# Patient Record
Sex: MALE | Race: WHITE | NOT HISPANIC OR LATINO | Employment: OTHER | URBAN - METROPOLITAN AREA
[De-identification: names, ages, dates, MRNs, and addresses within clinical notes are randomized per-mention and may not be internally consistent; named-entity substitution may affect disease eponyms.]

---

## 2023-01-01 ENCOUNTER — APPOINTMENT (OUTPATIENT)
Dept: PRIMARY CARE | Facility: CLINIC | Age: 83
End: 2023-01-01
Payer: MEDICARE

## 2023-01-01 ENCOUNTER — TELEPHONE (OUTPATIENT)
Dept: PRIMARY CARE | Facility: CLINIC | Age: 83
End: 2023-01-01
Payer: MEDICARE

## 2023-01-01 ENCOUNTER — OFFICE VISIT (OUTPATIENT)
Dept: PRIMARY CARE | Facility: CLINIC | Age: 83
End: 2023-01-01
Payer: MEDICARE

## 2023-01-01 ENCOUNTER — LAB (OUTPATIENT)
Dept: LAB | Facility: LAB | Age: 83
End: 2023-01-01
Payer: MEDICARE

## 2023-01-01 ENCOUNTER — PATIENT OUTREACH (OUTPATIENT)
Dept: PRIMARY CARE | Facility: CLINIC | Age: 83
End: 2023-01-01

## 2023-01-01 ENCOUNTER — TELEPHONE (OUTPATIENT)
Dept: PRIMARY CARE | Facility: CLINIC | Age: 83
End: 2023-01-01

## 2023-01-01 ENCOUNTER — PATIENT OUTREACH (OUTPATIENT)
Dept: PRIMARY CARE | Facility: CLINIC | Age: 83
End: 2023-01-01
Payer: MEDICARE

## 2023-01-01 VITALS
HEART RATE: 55 BPM | HEIGHT: 68 IN | SYSTOLIC BLOOD PRESSURE: 126 MMHG | TEMPERATURE: 96.8 F | OXYGEN SATURATION: 97 % | BODY MASS INDEX: 27.28 KG/M2 | WEIGHT: 180 LBS | DIASTOLIC BLOOD PRESSURE: 59 MMHG

## 2023-01-01 VITALS
OXYGEN SATURATION: 99 % | TEMPERATURE: 96.4 F | SYSTOLIC BLOOD PRESSURE: 131 MMHG | BODY MASS INDEX: 26.98 KG/M2 | HEART RATE: 56 BPM | HEIGHT: 68 IN | WEIGHT: 178 LBS | DIASTOLIC BLOOD PRESSURE: 65 MMHG

## 2023-01-01 DIAGNOSIS — G31.84 MCI (MILD COGNITIVE IMPAIRMENT): Primary | ICD-10-CM

## 2023-01-01 DIAGNOSIS — Z86.79 PERSONAL HISTORY OF OTHER DISEASES OF THE CIRCULATORY SYSTEM: ICD-10-CM

## 2023-01-01 DIAGNOSIS — I10 ESSENTIAL HYPERTENSION: Primary | ICD-10-CM

## 2023-01-01 DIAGNOSIS — G93.40 ENCEPHALOPATHY: Primary | ICD-10-CM

## 2023-01-01 DIAGNOSIS — F22 DELUSION (MULTI): ICD-10-CM

## 2023-01-01 DIAGNOSIS — F03.918 DEMENTIA WITH BEHAVIORAL DISTURBANCE (MULTI): Primary | ICD-10-CM

## 2023-01-01 DIAGNOSIS — R26.81 GAIT INSTABILITY: ICD-10-CM

## 2023-01-01 DIAGNOSIS — I10 ESSENTIAL HYPERTENSION: ICD-10-CM

## 2023-01-01 DIAGNOSIS — G31.84 MCI (MILD COGNITIVE IMPAIRMENT): ICD-10-CM

## 2023-01-01 DIAGNOSIS — N40.1 BENIGN PROSTATIC HYPERPLASIA WITH LOWER URINARY TRACT SYMPTOMS, SYMPTOM DETAILS UNSPECIFIED: ICD-10-CM

## 2023-01-01 DIAGNOSIS — F22 DELUSIONS (MULTI): ICD-10-CM

## 2023-01-01 DIAGNOSIS — R41.82 ALTERED MENTAL STATUS, UNSPECIFIED ALTERED MENTAL STATUS TYPE: ICD-10-CM

## 2023-01-01 LAB
ANION GAP IN SER/PLAS: 11 MMOL/L (ref 10–20)
ANION GAP IN SER/PLAS: 12 MMOL/L (ref 10–20)
APPEARANCE, URINE: CLEAR
BASOPHILS (10*3/UL) IN BLOOD BY AUTOMATED COUNT: 0.06 X10E9/L (ref 0–0.1)
BASOPHILS/100 LEUKOCYTES IN BLOOD BY AUTOMATED COUNT: 0.9 % (ref 0–2)
BILIRUBIN, URINE: NEGATIVE
BLOOD, URINE: NEGATIVE
CALCIUM (MG/DL) IN SER/PLAS: 7.8 MG/DL (ref 8.6–10.3)
CALCIUM (MG/DL) IN SER/PLAS: 9.4 MG/DL (ref 8.6–10.6)
CARBON DIOXIDE, TOTAL (MMOL/L) IN SER/PLAS: 25 MMOL/L (ref 21–32)
CARBON DIOXIDE, TOTAL (MMOL/L) IN SER/PLAS: 26 MMOL/L (ref 21–32)
CHLORIDE (MMOL/L) IN SER/PLAS: 105 MMOL/L (ref 98–107)
CHLORIDE (MMOL/L) IN SER/PLAS: 105 MMOL/L (ref 98–107)
COLOR, URINE: YELLOW
CREATININE (MG/DL) IN SER/PLAS: 1.18 MG/DL (ref 0.5–1.3)
CREATININE (MG/DL) IN SER/PLAS: 1.53 MG/DL (ref 0.5–1.3)
EOSINOPHILS (10*3/UL) IN BLOOD BY AUTOMATED COUNT: 0.23 X10E9/L (ref 0–0.4)
EOSINOPHILS/100 LEUKOCYTES IN BLOOD BY AUTOMATED COUNT: 3.3 % (ref 0–6)
ERYTHROCYTE DISTRIBUTION WIDTH (RATIO) BY AUTOMATED COUNT: 14.3 % (ref 11.5–14.5)
ERYTHROCYTE DISTRIBUTION WIDTH (RATIO) BY AUTOMATED COUNT: 16 % (ref 11.5–14.5)
ERYTHROCYTE MEAN CORPUSCULAR HEMOGLOBIN CONCENTRATION (G/DL) BY AUTOMATED: 30.9 G/DL (ref 32–36)
ERYTHROCYTE MEAN CORPUSCULAR HEMOGLOBIN CONCENTRATION (G/DL) BY AUTOMATED: 32.8 G/DL (ref 32–36)
ERYTHROCYTE MEAN CORPUSCULAR VOLUME (FL) BY AUTOMATED COUNT: 100 FL (ref 80–100)
ERYTHROCYTE MEAN CORPUSCULAR VOLUME (FL) BY AUTOMATED COUNT: 97 FL (ref 80–100)
ERYTHROCYTES (10*6/UL) IN BLOOD BY AUTOMATED COUNT: 3.16 X10E12/L (ref 4.5–5.9)
ERYTHROCYTES (10*6/UL) IN BLOOD BY AUTOMATED COUNT: 4.07 X10E12/L (ref 4.5–5.9)
GFR MALE: 44 ML/MIN/1.73M2
GFR MALE: 60 ML/MIN/1.73M2
GLUCOSE (MG/DL) IN SER/PLAS: 100 MG/DL (ref 74–99)
GLUCOSE (MG/DL) IN SER/PLAS: 91 MG/DL (ref 74–99)
GLUCOSE, URINE: NEGATIVE MG/DL
HEMATOCRIT (%) IN BLOOD BY AUTOMATED COUNT: 31.7 % (ref 41–52)
HEMATOCRIT (%) IN BLOOD BY AUTOMATED COUNT: 39.3 % (ref 41–52)
HEMOGLOBIN (G/DL) IN BLOOD: 12.9 G/DL (ref 13.5–17.5)
HEMOGLOBIN (G/DL) IN BLOOD: 9.8 G/DL (ref 13.5–17.5)
IMMATURE GRANULOCYTES/100 LEUKOCYTES IN BLOOD BY AUTOMATED COUNT: 0.9 % (ref 0–0.9)
KETONES, URINE: NEGATIVE MG/DL
LEUKOCYTE ESTERASE, URINE: NEGATIVE
LEUKOCYTES (10*3/UL) IN BLOOD BY AUTOMATED COUNT: 10.7 X10E9/L (ref 4.4–11.3)
LEUKOCYTES (10*3/UL) IN BLOOD BY AUTOMATED COUNT: 7 X10E9/L (ref 4.4–11.3)
LYMPHOCYTES (10*3/UL) IN BLOOD BY AUTOMATED COUNT: 1.24 X10E9/L (ref 0.8–3)
LYMPHOCYTES/100 LEUKOCYTES IN BLOOD BY AUTOMATED COUNT: 17.8 % (ref 13–44)
MONOCYTES (10*3/UL) IN BLOOD BY AUTOMATED COUNT: 0.61 X10E9/L (ref 0.05–0.8)
MONOCYTES/100 LEUKOCYTES IN BLOOD BY AUTOMATED COUNT: 8.8 % (ref 2–10)
NEUTROPHILS (10*3/UL) IN BLOOD BY AUTOMATED COUNT: 4.76 X10E9/L (ref 1.6–5.5)
NEUTROPHILS/100 LEUKOCYTES IN BLOOD BY AUTOMATED COUNT: 68.3 % (ref 40–80)
NITRITE, URINE: NEGATIVE
NRBC (PER 100 WBCS) BY AUTOMATED COUNT: 0 /100 WBC (ref 0–0)
NRBC (PER 100 WBCS) BY AUTOMATED COUNT: 0 /100 WBC (ref 0–0)
PH, URINE: 5 (ref 5–8)
PLATELETS (10*3/UL) IN BLOOD AUTOMATED COUNT: 224 X10E9/L (ref 150–450)
PLATELETS (10*3/UL) IN BLOOD AUTOMATED COUNT: 281 X10E9/L (ref 150–450)
POTASSIUM (MMOL/L) IN SER/PLAS: 4.1 MMOL/L (ref 3.5–5.3)
POTASSIUM (MMOL/L) IN SER/PLAS: 4.7 MMOL/L (ref 3.5–5.3)
PROTEIN, URINE: NEGATIVE MG/DL
SODIUM (MMOL/L) IN SER/PLAS: 136 MMOL/L (ref 136–145)
SODIUM (MMOL/L) IN SER/PLAS: 139 MMOL/L (ref 136–145)
SPECIFIC GRAVITY, URINE: 1.02 (ref 1–1.03)
THYROTROPIN (MIU/L) IN SER/PLAS BY DETECTION LIMIT <= 0.05 MIU/L: 1.94 MIU/L (ref 0.44–3.98)
UREA NITROGEN (MG/DL) IN SER/PLAS: 29 MG/DL (ref 6–23)
UREA NITROGEN (MG/DL) IN SER/PLAS: 34 MG/DL (ref 6–23)
UROBILINOGEN, URINE: <2 MG/DL (ref 0–1.9)

## 2023-01-01 PROCEDURE — 1036F TOBACCO NON-USER: CPT | Performed by: FAMILY MEDICINE

## 2023-01-01 PROCEDURE — 1160F RVW MEDS BY RX/DR IN RCRD: CPT | Performed by: FAMILY MEDICINE

## 2023-01-01 PROCEDURE — 81003 URINALYSIS AUTO W/O SCOPE: CPT

## 2023-01-01 PROCEDURE — 1159F MED LIST DOCD IN RCRD: CPT | Performed by: FAMILY MEDICINE

## 2023-01-01 PROCEDURE — 99496 TRANSJ CARE MGMT HIGH F2F 7D: CPT | Performed by: FAMILY MEDICINE

## 2023-01-01 PROCEDURE — 99214 OFFICE O/P EST MOD 30 MIN: CPT | Performed by: FAMILY MEDICINE

## 2023-01-01 PROCEDURE — 3074F SYST BP LT 130 MM HG: CPT | Performed by: FAMILY MEDICINE

## 2023-01-01 PROCEDURE — 84443 ASSAY THYROID STIM HORMONE: CPT

## 2023-01-01 PROCEDURE — 85025 COMPLETE CBC W/AUTO DIFF WBC: CPT

## 2023-01-01 PROCEDURE — 3078F DIAST BP <80 MM HG: CPT | Performed by: FAMILY MEDICINE

## 2023-01-01 PROCEDURE — 36415 COLL VENOUS BLD VENIPUNCTURE: CPT

## 2023-01-01 PROCEDURE — 80048 BASIC METABOLIC PNL TOTAL CA: CPT

## 2023-01-01 RX ORDER — AMLODIPINE AND BENAZEPRIL HYDROCHLORIDE 10; 40 MG/1; MG/1
1 CAPSULE ORAL DAILY
Qty: 90 CAPSULE | Refills: 1 | Status: SHIPPED | OUTPATIENT
Start: 2023-01-01 | End: 2023-01-01 | Stop reason: CLARIF

## 2023-01-01 RX ORDER — DONEPEZIL HYDROCHLORIDE 5 MG/1
TABLET, FILM COATED ORAL
COMMUNITY
Start: 2023-01-01 | End: 2023-01-01 | Stop reason: SINTOL

## 2023-01-01 RX ORDER — RIVAROXABAN 20 MG/1
1 TABLET, FILM COATED ORAL DAILY
COMMUNITY
Start: 2022-01-01

## 2023-01-01 RX ORDER — LANOLIN ALCOHOL/MO/W.PET/CERES
100 CREAM (GRAM) TOPICAL DAILY
COMMUNITY

## 2023-01-01 RX ORDER — DOXAZOSIN 4 MG/1
TABLET ORAL
Qty: 45 TABLET | Refills: 1 | Status: SHIPPED | OUTPATIENT
Start: 2023-01-01 | End: 2023-01-01

## 2023-01-01 RX ORDER — FAMOTIDINE 40 MG/1
1 TABLET, FILM COATED ORAL DAILY
COMMUNITY
Start: 2022-01-24 | End: 2023-01-01 | Stop reason: SINTOL

## 2023-01-01 RX ORDER — DOXAZOSIN 4 MG/1
TABLET ORAL
Qty: 135 TABLET | Refills: 1 | Status: SHIPPED | OUTPATIENT
Start: 2023-01-01 | End: 2023-01-01 | Stop reason: CLARIF

## 2023-01-01 RX ORDER — LORATADINE 10 MG/1
10 TABLET ORAL DAILY
COMMUNITY
End: 2023-01-01 | Stop reason: SINTOL

## 2023-01-01 RX ORDER — FUROSEMIDE 20 MG/1
1 TABLET ORAL DAILY
COMMUNITY
Start: 2022-01-01

## 2023-01-01 RX ORDER — ASPIRIN 81 MG/1
81 TABLET ORAL
COMMUNITY
Start: 2016-02-03

## 2023-01-01 RX ORDER — ATORVASTATIN CALCIUM 40 MG/1
TABLET, FILM COATED ORAL
COMMUNITY
Start: 2023-01-01

## 2023-01-01 RX ORDER — DONEPEZIL HYDROCHLORIDE 10 MG/1
TABLET, FILM COATED ORAL
COMMUNITY
Start: 2023-01-01 | End: 2023-01-01 | Stop reason: SINTOL

## 2023-01-01 ASSESSMENT — LIFESTYLE VARIABLES
HOW OFTEN DO YOU HAVE SIX OR MORE DRINKS ON ONE OCCASION: NEVER
HOW OFTEN DO YOU HAVE A DRINK CONTAINING ALCOHOL: MONTHLY OR LESS
SKIP TO QUESTIONS 9-10: 1
HOW MANY STANDARD DRINKS CONTAINING ALCOHOL DO YOU HAVE ON A TYPICAL DAY: 1 OR 2
AUDIT-C TOTAL SCORE: 1

## 2023-01-01 ASSESSMENT — PATIENT HEALTH QUESTIONNAIRE - PHQ9
SUM OF ALL RESPONSES TO PHQ9 QUESTIONS 1 & 2: 0
2. FEELING DOWN, DEPRESSED OR HOPELESS: NOT AT ALL
1. LITTLE INTEREST OR PLEASURE IN DOING THINGS: NOT AT ALL

## 2023-05-10 NOTE — PATIENT INSTRUCTIONS
Discontinue famotidine  Continue loratadine  Continue other meds  Obtain labs  Follow-up in 6 weeks

## 2023-05-10 NOTE — PROGRESS NOTES
"83-year-old male who is here because son and wife are concerned about recent episodes of confusion and short-term memory loss.  He was golfing with his son the other day and he could not calculate up golf score.  He is forgetting instructions from wife.  He is still driving and is affable following directions he is aware of his surroundings and does not get lost.  He plays online chess with his family.  He still paints he is not depressed or anxious.  Denies shortness of breath orthopnea increased leg edema chest pain palpitations diaphoresis epistaxis melena hematochezia.  He complains of urinary frequency which is not new while on furosemide.  He denies hesitancy dysuria abdominal pain flank pain fever        /59   Pulse 55   Temp 36 °C (96.8 °F)   Ht 1.727 m (5' 8\")   Wt 81.6 kg (180 lb)   SpO2 97%   BMI 27.37 kg/m²       Appears comfortable  No retractions  Skin without pallor petechia icterus cyanosis  Neck without JVD thyromegaly bruits  Chest wall nontender  Chest clear to auscultation without rales rhonchi wheeze  Heart regular rate and irregular rhythm without murmur  Abdomen soft nondistended nontender without organomegaly or mass  Extremities without erythema Homans or cord  Peripheral pulses palpable  Neuro intact    Cecilio cognitive assessment 24 out of 26  "

## 2023-05-17 NOTE — TELEPHONE ENCOUNTER
Rosamaria Anguiano (wife) put son on the phone.  Son said he thinks his father has gotten worse since being seen on Monday.  Dr. Ortega directed pt. To be taken to E.R. for CT scan.  Also gave SW neurologist names and phone numbers to Rosamaria.

## 2023-05-17 NOTE — TELEPHONE ENCOUNTER
Patient was hoping for STAT on Neurology referral. They have been working with central scheduling and the soonest available is not until next January.

## 2023-05-17 NOTE — TELEPHONE ENCOUNTER
Patient's wife notified----- Message from Oleg Reilly MD sent at 5/17/2023  8:43 AM EDT -----  Notify normal.  Urinalysis shows no cause of his confusion

## 2023-05-17 NOTE — TELEPHONE ENCOUNTER
Patient's wife notified ----- Message from Oleg Reilly MD sent at 5/12/2023  1:04 PM EDT -----  No significant abnormalities.  Nothing that would explain confusion or altered mental status.  No additional testing needed follow-up as previously scheduled

## 2023-05-22 NOTE — PATIENT INSTRUCTIONS
Continue antihypertensives Xarelto aspirin statin  Stop donepezil  Formal PT ordered  Follow-up with neurology.  Patient family request to see Dr. Walden at [facility]

## 2023-05-22 NOTE — PROGRESS NOTES
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 Lashay Mcintyre- neurology          Reason for Referral:   inpatient follow up for dementia          Call to Schedule in:   2-3 months  Hospital Encounter and Summary: not available at this time     TCM outreach deferred as patient had follow up with PCP within 2 business days of discharge on 5/22/23.

## 2023-05-22 NOTE — PROGRESS NOTES
"  83-year-old male admitted 5/17/2023 with alteration mental status.  He was seen May 10 for recent episodes of confusion and short-term memory loss.  As MoCA score was 24.  At that time urinalysis was unremarkable as was TSH CBC and BMP.  Over the next several days ago he became progressively worse with some delusional behavior when he went in for his urinalysis he took his computer and his TV remote with him.  He was found overnight naked in the bathroom trying to calculate the amount of water that was coming out of the faucet.  He believes he needs to do this.  On admission BNP was elevated but no change from previous.  CMP was unremarkable troponin level was normal methyl melanotic acid was normal A1c was normal, folate was normal LDL was 64 total cholesterol was 107 HDL was 29.6.  CT of the brain showed no acute intracranial hemorrhage or mass.  MRI showed no acute infarct mass or bleed.  Ventricular size was normal.  There is some chronic ischemic changes in frontal lobes.  There is moderate to severe generalized brain atrophy.  There is mild to moderate stenosis of proximal left MCA.  There is less than 20% stenosis right ICA ,  50% stenosis left ICA.  He was discharged on donepezil aspirin.  He was continued on his amlodipine-benazepril doxazosin Xarelto and furosemide.    He was also discharged on donepezil but he has been sluggish and sleepy on this medication since he started per wife and son.  He seems a bit weaker with a sluggish gait    /65   Pulse 56   Temp 35.8 °C (96.4 °F)   Ht 1.727 m (5' 8\")   Wt 80.7 kg (178 lb)   SpO2 99%   BMI 27.06 kg/m²       Appears somewhat deconditioned  Appears somewhat confused lethargic nodding off during discussion  Skin without pallor petechia icterus cyanosis  Neck without JVD thyromegaly bruits  Chest wall nontender  Chest clear to auscultation without rales rhonchi wheeze  Heart regular rate and rhythm without murmur  Abdomen soft nondistended nontender " without organomegaly or mass  Extremities without erythema edema Homans or cord  Peripheral pulses palpable  Neuro intact

## 2023-05-23 NOTE — TELEPHONE ENCOUNTER
Patient's wife wanted to let you know the earliest they could get in with neurology is June 23rd. She didn't know if that was to late and if she should try to get in sooner.    He was just seen 05/22

## 2023-05-25 NOTE — PROGRESS NOTES
Call regarding appt. with PCP on (5/22/23) after hospitalization.  At time of outreach call the patient caregiver/wife feels as if their condition has remained the same since last visit.  Reviewed the PCP appointment with the pt caregiver and addressed any questions or concerns.

## 2023-05-30 NOTE — TELEPHONE ENCOUNTER
Patieint's wife called.  Edward was in shower for over 1 hour - he said he had to read lines on the knob.  She had to call sons to get him out.  Wife states he kept water running because he had to measure it..  Family tries to distract from behavior, but  goes right back to it.  Edward took the TV controller into bathroom because he thought he could measure the water with it.  Wife feels something is going on, possibly with  the statin and what she thinks is delirium.

## 2023-05-30 NOTE — TELEPHONE ENCOUNTER
Pt. Wife said specialist appointment is not until June 23rd.  She has put Edward on the cancellation list also.

## 2023-05-30 NOTE — TELEPHONE ENCOUNTER
I do not think statin medication is related to his mental status.  Follow-up with specialist as previously discussed

## 2023-06-12 NOTE — TELEPHONE ENCOUNTER
Edward went to rehab for evaluation on Friday.  They suggested that Edward would benefit from speech therapy.  Please enter order for speech therapy.

## 2023-06-13 NOTE — TELEPHONE ENCOUNTER
Called and spoke with Sophia Bertrand.  Told her order/referral for speech therapy was done by Dr. Ortega.  Told her to call back if rehab  Could not access the order and we would fax it.

## 2023-06-14 NOTE — TELEPHONE ENCOUNTER
Patient is having excessive urination. This has been for about a week.     Has been wetting the bed even with his depends. He is not complaining of burning or pain.     He has had 3 bloody noses since Sunday.     Also wanted to let you know he saw you 5/22 he weighed 178 at the office and now on home scale he is down to 170.     His ear was hurting him this morning.    Patient has appt on Monday with you for a wellness physical.

## 2023-06-22 NOTE — PROGRESS NOTES
Discharge Facility: [facility]   Discharge Diagnosis:  Admission Date:  Discharge Date:     PCP Appointment Date:  Specialist Appointment Date:   Hospital Encounter and Summary: Linked or not available at this time (pick one)  See discharge assessment below for further details

## 2023-07-07 NOTE — TELEPHONE ENCOUNTER
Patient's son, Edward is asking for a call to discuss father's progress.  Father is in [facility] ([phone] for son, Edward).

## 2023-07-21 NOTE — PROGRESS NOTES
TCM outreach deferred as patient was admitted to hospice care with [facility].